# Patient Record
Sex: FEMALE | Race: WHITE | NOT HISPANIC OR LATINO | URBAN - METROPOLITAN AREA
[De-identification: names, ages, dates, MRNs, and addresses within clinical notes are randomized per-mention and may not be internally consistent; named-entity substitution may affect disease eponyms.]

---

## 2019-02-04 ENCOUNTER — TELEPHONE (OUTPATIENT)
Dept: URGENT CARE | Facility: CLINIC | Age: 24
End: 2019-02-04

## 2019-02-04 NOTE — TELEPHONE ENCOUNTER
Miss Pierre called and was wanting to know if she should be seen again because her ears were starting to bother her and she was having dizzy spells. She stated when she was in here a week ago the doctor told her she did have some fluid on her ears. I advised her that she could come back in and be reevaluated or she could try to get in with her primary care doctor. Patient verbalized understanding.